# Patient Record
Sex: MALE | URBAN - METROPOLITAN AREA
[De-identification: names, ages, dates, MRNs, and addresses within clinical notes are randomized per-mention and may not be internally consistent; named-entity substitution may affect disease eponyms.]

---
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
## 2021-06-16 NOTE — PROGRESS NOTES
"Intake Note:   D) Jose Luis Santiago is a 17 y.o. single Other male who is referred to [facility] via Shana Valdovinos/Kaelyn Brady, Primary Therapist at [facility] to HE [facility] at [facility], with funding from [facility]. Patient orientated x 3. Patient meets criteria for Cannabis Use Disorder, Severe (F12.20).  Patient appears appropriate for [facility].   A) Met with patient for 35 minutes.  Completed intake assessment and preliminary paperwork. Patient was given and explained counselor & supervisor license number and contact info, Patient Bill of Rights, program rules/regulations, Program Abuse Prevention Plan, confidentiality & HIPPA policies, grievance procedure, presented ROIs, TB & HIV/AIDS policies & resources.    R)Patient signed and agreed to counselor & supervisor license number and contact info., Patient Bill of Rights, group rules/regulations, Program Abuse Prevention Plan, confidentiality & HIPPA policies, grievance procedure,  ROIs, TB & HIV/AIDS policies & resources. Patient scored Lower Risk on PANSI screen. Patient denied suicidal ideation/intent/plan/means at this time.     Opioid Use Disorder: No   Provided \"Options for Opioid Treatment in [place] and Overdose Prevention\" NA     Dimension #1 - Withdrawal Potential - Risk 0. No Concern.  Patient reports a history of daily marijuana use, with his last use being on 12/26/17. He also reports some use of xanax, with his last use being about 1 year ago. He reports some experimentation with alcohol and cocaine as well. He denies any current withdrawal symptoms, and does not appear to be at risk of withdrawal at this time.     Dimension #2 - Biomedical - Risk 0. No Concern.  Patient is currently under the care of [facility] medical unit for all medical issues. He denies any health concerns at this time. He appears to be in good physical health and functioning at this time. He denies any immediate medical concerns.     Dimension #3 - Emotional , Behavioral " and Cognitive - Risk 1. Mild Concern.  Patient reported no current mental health diagnosis. He does endorse some symptoms of depression and anxiety, and reports a great deal of anxiety currently related to his family and being at [facility]. He appears to struggle with impulse control as evidence by his placement at [facility], as well as his struggles to consistently meet program expectations at [facility]. He denies any suicidal or homicidal thoughts or plans.     Dimension #4 - Treatment Resistance - Risk 1. Mild Concern.  Patient reports that he does not believe that he has a substance use problem at this time, however reports that he is willing to stop his use. He was calm and cooperative throughout this intake, and shared that he is still not happy about being in group but accepts that he needs to do it and get it done. His motivation appears to be largely due to family pressure at this time as well as his legal involvement.     Dimension #5 - Relapse Potential - Risk 3. Serious Concern.  Patient reports no history of treatment and so appears to lack knowledge and skills about substance use and recovery. He reports a history of attempting to stop, but ultimately returned to use. This may indicate that he may lack some coping skills to arrest his use and prevent relapse outside of a controlled environment. He may be at an increased risk of relapse at this time if he returns to his old school and peer group. His lack of support may also indicate an increased risk of relapse at this time. He appears to lack some insight into his use and the issues it has caused him up until this time in his life.     Dimension #6 - Recovery Environment - Risk 3. Serious Concern.  Patient is currently in a court ordered out of home placement at [facility]. Patient reports that he was not attending school regularly in the community, and so appeared to lack structured activity in his daily life. He did report a job, but it is unclear how that was going.  Patient reports some support from his family, however per his  he does not have any support in the community outside of her. He does not appear to have meaningful activity in his life as he reports that when not at school he was using. He is currently on probation and will remain on following his release from [facility].     T) Explained counselor & supervisor license number and contact info, Patient Bill of Rights, program rules/regulations, Program Abuse Prevention Plan, confidentiality & HIPPA policies, grievance procedure, presented ROIs, TB & HIV/AIDS policies & resources, and Vulnerable Adult policy. Patient expected to start group on 3/1/18.      JOSE MARIA Robert  3/1/2018, 11:05 AM
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
## 2021-06-16 NOTE — PROGRESS NOTES
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 responsibility, both legal and care giving; and what arrangements are made for supervision for the children when the person is not available.    6B. Do you have children who do not live with you?  no  If yes, ask follow up questions to learn where the children are, who has custody and what the person's relationship and responsibility is with these children and what hopes the person has for his or her future with these children.    7A. Who supports you in making changes in your alcohol or drug use? What are they willing to do to support you? Who is upset or angry about you making changes in your alcohol or drug use? How big a problem is this for you?       Mother, aunties, mother's significant other, brothers, grandmother, and .     7B. This table is provided to record information about the person s relationships and available support It is not necessary to ask each item; only to get a comprehensive picture of their support system.  How often can you count on the following people when you need someone?   Partner / Spouse Always supportive   Parent(s)/Aunt(s)/Uncle(s)/Grandparents Always supportive   Sibling(s)/Cousin(s) Brothers- Always  Sisters- Rarely; does not believe they would be willing to stop their use.    Child(matt)    Other relative(s)  Always supportive   Friend(s)/neighbor(s) Rarely supportive- not willing to stop use   Child(matt) s father(s)/mother(s)    Support group member(s)    Community of [employer] members Always supportive   /counselor/therapist/healer Always supportive   Other (specify)  Always supportive     8A. What is your current living situation?       Patient is currently placed at the [facility] in the [facility] program.      8B. What is your long term plan for where you will be living?      Patient reports that he is thinking about going to a group home following [facility], otherwise he will live with his mother.     8C. Tell me about your living environment/neighborhood? Ask enough follow up questions to determine safety, criminal activity, availability of alcohol and drugs, supportive or antagonistic to the person making changes.       Reports that where his mother lives is safe, and he shared no concerns about crime or use.     9. Criminal justice history: Gather current/recent history and any significant history related to substance use--Arrests? Convictions? Circumstances? Alcohol or drug involvement? Sentences? Still on probation or parole? Expectations of the court? Current court order? Any sex offenses - lifetime? What level? (DSM)       Patient reported charges of assault (3-4), stolen vehicles, aggravated robbery, and possession of marijuana.   His record indicates the following charges:  Theft of a motor vehicle (3), receiving stolen property, tampering with a motor vehicle (2), assault; 2nd degree, 3rd degree (2), 5th degree, aggravated robbery, 5th degree possession, liquor consumption by a minor, sale of poisons.     10. What obstacles exist to participating in treatment? (Time off work, childcare, funding, transportation, pending shelter time, living situation)       NA    Dimension VI Ratings   Recovery environment - The placing authority must use the criteria in Dimension VI to determine a client s  recovery environment.   RISK DESCRIPTIONS - Severity rating: 3-Patient is not engaged in structured, meaningful activity and the patient's peers, family , significant other, and living environment are unsupportive, or there is significant criminal justice system involvement.    REASONS SEVERITY WAS ASSIGNED - Patient is currently in a court ordered out of home placement at [facility]. Patient reports that he was not attending school regularly in the community, and so appeared to lack structured activity in his daily life. He did report a job, but it is unclear how that was going. Patient reports some support from his family, however per his  he does not have any support in the community outside of her. He does not appear to have meaningful activity in his life as he reports that when not at school he was using. He is currently on probation and will remain on following his release from [facility].          Client Choice/Exceptions     Would you like services specific to language, age, gender, culture, Restorationist preference, race, ethnicity, sexual orientation or disability?  yes    If yes, specify:  Client is open to diverse groups.      What particular treatment choices and options would you like to have?  Male, Adolescent     Do you have a preference for a particular treatment program?  [facility]    DSM-V Criteria for Substance Abuse  Instructions  Determine whether the client currently meets the criteria for a Substance Use Disorder using the diagnostic criteria in the DSM-V, pp. 481-589. Current means during the most recent 12 months outside a facility that controls access to substances.    Category of substance Severity ICD-10 Code/DSM V Code   []  Alcohol Use Disorder [] Mild  [] Moderate  [] Severe [] (F10.10) (305.00)  [] (F10.20) (303.90)  [] (F10.20) (303.90)   [x]  Cannabis Use Disorder [] Mild  [] Moderate  [x] Severe [] (F12.10) (305.20)  [] (F12.20) (304.30)  [x] (F12.20) (304.30)   []  Hallucinogen Use Disorder [] Mild  [] Moderate  [] Severe [] (F16.10) (305.30)  [] (F16.20) (304.50)  [] (F16.20) (304.50)   []  Inhalant Use Disorder [] Mild  [] Moderate  [] Severe [] (F18.10) (305.90)  [] (F18.20) (304.60)  [] (F18.20) (304.60)   []  Opioid Use Disorder [] Mild  [] Moderate  [] Severe [] (F11.10) (305.50)  [] (F11.20) (304.00)  [] (F11.20) (304.00)   []  Sedative, Hypnotic, or Anxiolytic Use Disorder [] Mild  [] Moderate  [] Severe [] (F13.10) (305.40)   [] (F13.20) (304.10)  [] (F13.20) (304.10)   []  Stimulant Related Disorders [] Mild  [] Moderate  [] Severe [] (F15.10) (305.70) Amphetamine type substance  [] (F14.10) (305.60) Cocaine  [] (F15.10) (305.70) Other or unspecified stimulant  [] (F15.20) (304.40) Amphetamine type substance  [] (F14.20) (304.20) Cocaine  [] (F15.20) (304.40) Other or unspecified stimulant  [] (F15.20) (304.40) Amphetamine type substance  [] (F14.20) (304.20) Cocaine  [] (F15.20) (304.40) Other or unspecified stimulant   []  Tobacco use Disorder [] Mild  [] Moderate  [] Severe [] (Z72.0) (305.1)  [] (F17.200) (305.1)  [] (F17.200) (305.1)   []  Other (or unknown) Substance Use Disorder [] Mild  [] Moderate  [] Severe [] (F19.10) (305.90)  [] (F19.20) (304.90)  [] (F19.20) (304.90)       Suggested Level of Care Necessary for Recovery  []  Inpatient  []  Extended Care []  Residential [x]  Outpatient  []  None      Collateral Contact Summary   Number of contacts made:  2  Contact with referring person:  yes     If court related records were reviewed, summarize here:  Records indicate positive UAs for THC on 12/26/17, 11/30/17, 11/21/17, 11/14/17, 11/3/14 (positive for THC and Cocaine), and 9/22/17. YLS indicates high risk in substance use category.     [x] Information from collateral contacts supported/largely agreed with information from the client and associated risk ratings./  [] Information from collateral contacts was significantly different from information from  the client and lead to different risk ratings.      Summarize new information here:      Rule 25 Assessment Summary and Plan   's Recommendation    Patient meets DSM IV criteria for Cannabis Use Disorder, Severe (F12.20).  Recommendation is for this patient to attend the [facility] AIOP program while at the [facility] and follow aftercare recommendations once programming is complete.      Collateral Contacts     Please duplicate this page for each contact.  If this includes information which is sensitive and not public, separate this page from the rest of the assessment before sharing.  Retain the page in the assessment file.   Name    Shana Bonifacio/Kaelyn Brady/[facility] Staff Relationship    SW/Primary Therapist Phone Number    [phone] Releases    yes       Information Provided:      Shana provided the following information:  Patient has a history of marijuana use, and has tested positive for cocaine once.   He reports that he has only used cocaine the one time he tested positive for it.   His mother reports concern regarding substance use.    shares concerns as well.     Kaelyn expressed concern about the patient's use as he has has a number of positive UAs.     Collateral Contacts     Name    Yumiko Dangelo/[facility]/   Relationship     Phone Number    [phone] Releases    yes       Information Provided:      Attempted to contact Yumiko 2/12/18 @ 8574. No answer, left message to return call with any information.       Yumiko reported the following:    She is concerned about the patient's substance use.   She has collected UAs from the patient that have typically been positive for THC, but believes that he has tested positive for opiates and cocaine at least 1 time.   His marijuana use was much more prevalent than the other use.   She has gotten a high number of reports from different community programs that the patient was involved in that he  would either seem high while there, or would return smelling strongly of marijuana.   She never saw or experienced the patient under the influence, but it has become very obvious to her that he is now sober as his physical appears have changed (complexion and such).   His mother has expressed concern to her as well, and sent her a snapchat video where the patient was rolling a blunt.   His substance use has impacted everything in his life, and he seemed very preoccupied by it.   Patient was trying to clean out his system while in the community, by taking detox pills. This was reported to her by the patient's sister as well as pills that he had in his backpack upon arriving to [facility]. She believes that the patient was attempting to get around the system, but it was not working as he was consistently coming back positive for marijuana.   Patient did complete an assessment before he was on probation that recommended individual therapy. He began doing this and then stopped when his legal proceeding began. She struggled to get the patient to even go to school let alone therapy.   She believes that services here would be good, as long as there is continuing care in the community as she feels that he is going to need more support than just support groups.   Patient does not have any support in the community other than her, as his mother tries, but has a lot of history and so she does not feel that the patient always listens and takes what his mother says to heart.         Staff Name and Title:  JOSE MARIA Robert  Date: 2/12/2018  Time:  12:25 PM

## 2021-06-16 NOTE — PROGRESS NOTES
Addiction Services - Initial Services Plan/Treatment Plan     Patient  Name: Jose Luis Santiago  MRN: [id number]   : 2000  Admit Date: 3/1/18       Patient describes their immediate need: To learn recovery skills to prevent relapse. Patient denies any other immediate needs.     Are there any immediate Safety Needs such as (physical, stability, mobility): Patient is under the care of [facility]. Pt is able to get medical care as needed. Pt denies immediate concerns.     Immediate Health Needs and Plan:   Remain abstinent from substance use and attend first group therapy session on 3/1/18    Vulnerable Adult: No     Issues to be addressed in the first sessions:   Treatment planning, orientation to group norms and rules, and welcomed by peers. Patient to complete substance use history packet and new group member questions.     Patient strengths and needs:   Strengths identified as good communication skills, hardworking, and coordinated.   Needs identified as relationship skills, anger, and time management.     Plan for patient for time between intake and completion of the treatment plan:   Attend all group therapy sessions as directed, complete all written and oral assignments as directed, and remain clean and sober. A relapse, if any, must be reported to staff immediately in order to ensure you are receiving the proper level of care.       Dimension 1: Acute Intoxication/Withdrawal Potential, Risk level: 0    Problem: Patient's last reported use was on 17.  Goal: Patient will remain abstinent throughout treatment, and his time at [facility].   Must be reached to complete treatment? Yes  Methods/Strategies (must include amount and frequency): Patient will report to his counselor immediately if any relapse/use happens.   Target Date: 18  Completion Date:     Dimension 2: Biomedical Conditions/Complications, Risk level: 0    Problem: Patient denies any medical concerns at this time, and appears to be  in good physical health.   Goal: Patient will maintain good health.   Must be reached to complete treatment? No  Methods/Strategies (must include amount and frequency): Patient will practice proper diet, sleep, and exercise habits daily in order to maintain his health.   Target Date: 5/29/18  Completion Date:   Must be reached to complete treatment? No  Methods/Strategies (must include amount and frequency): Patient will remain under the care of [facility] for all health needs and concerns.   Target Date: 5/29/18  Completion Date:  Must be reached to complete treatment? No  Methods/Strategies (must include amount and frequency): Patient will take medication, if any, as prescribed, if applicable.    Target Date: 5/29/18  Completion Date:         Dimension 3: Emotional/Behavioral/Cognitive, Risk level: 1    Problem: Patient struggles with consistently making positive decision.   Goal: Patient will gain insight into his behavior in order to work toward making better choices.   Must be reached to complete treatment? Yes  Methods/Strategies (must include amount and frequency): Patient will begin using coping skills (such as grounding, breathing, thought challenging, mindfulness, etc), and share any benefits or challenges that you experience using these skills.  Target Date: 5/29/18  Completion Date:       Dimension 4: Readiness to Change, Risk level 1    Problem: Patient reports minimal motivation for treatment at this time.   Goal: Patient to increase motivation towards recovery by participating in outpatient programming.   Must be reached to complete treatment? Yes  Methods/Strategies (must include amount and frequency):   Patient to attend 2, 1.5- 2hour groups per week and all scheduled 1:1s.  Target Date: 5/29/18  Completion Date:     Dimension 5: Relapse/Continued Use/Continued Problem Potential, Risk level: 3    Problem: Patient lacks knowledge about substance use and recovery.   Goal: Patient will gain  knowledge about substance use and recovery.   Must be reached to complete treatment? Yes  Methods/Strategies (must include amount and frequency): Patient will attend educational groups 2 times per week, and actively engage in order to gain knowledge.   Target Date: 5/29/18  Completion Date:     Problem: Patient lacks insight into his past use.   Goal: Patient will gain insight into his past use.   Must be reached to complete treatment? Yes  Methods/Strategies (must include amount and frequency):   Patient to share in daily check-in any urges and addictive thinking to better understand his pattern of use and to prevent relapse in the future.   Target Date: 5/29/18  Completion Date:     Dimension 6: Recovery Environment, Risk level: 3    Problem: Family involvement in treatment  Goal: Patient will involve family in his CD treatment.   Must be reached to complete treatment? Yes  Methods/Strategies (must include amount and frequency): Patient will attend and actively participate in all required staffings (mid-point, pre-release, etc) and update his family on his progress in CD.  Target Date: 5/29/18  Completion Date:     Problem: Coordination with the school system to address academic needs.  Goal: Patient will work to meet educational needs while at [facility], and develop a plan for his return to the community.   Must be reached to complete treatment? Yes  Methods/Strategies (must include amount and frequency): Patient will attend school daily (Monday-Friday) while at [facility] in order to meet educational needs, and gain school credits.   Target Date: 5/29/18  Completion Date:   Must be reached to complete treatment? No  Methods/Strategies (must include amount and frequency): Patient will work with his transition  to develop a plan for where he will attend school in the community, and register.   Target Date: 5/29/18  Completion Date:     Problem: Patient lacks a plan to address leisure activities without chemical  use.  Goal: Patient will develop sober hobbies.   Must be reached to complete treatment? Yes  Methods/Strategies (must include amount and frequency): Patient will engage in sober activity (rec, reading, large muscle, and other activities as allowed, etc) while at [facility] to develop things he enjoys doing without substance use.  Target Date: 5/29/18  Completion Date:     Resources  Resources to which the patient is being referred for problems when problems are to be addressed concurrently by another provider: AMADOU Varela/Primary Therapist, [facility] Staff, [facility], Probation, [employer].      Vulnerable Adult Review   [X] Review of the facility Abuse Prevention plan was reviewed with the patient   [X] No individual abuse plan is necessary   [ ] In addition to the facility Abuse Prevention plan, an Individual Abuse Plan will be put in place       Staff Name/Title: JOSE MARIA Robert Date: 3/1/2018  Time: 11:05 AM    By signing this document, I am acknowledging that I was actively and directly involved in the development of my treatment plan.       Patient  Signature_________________________________________             Date__________________
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
## 2021-06-16 NOTE — PROGRESS NOTES
Individual Treatment Plan    Patient  Name: Jose Luis Santiago  MRN: [id number]   : 2000  Admit Date: 3/1/8  Date of Initial Service Plan: 3/1/18  Tentative Discharge Date: 18    Counselor: JOSE MARIA Robert      Dimension 1: Acute Intoxication/Withdrawal Potential, Risk level: 0    Problem: Patient's last reported use was on 17.  Goal: Patient will remain abstinent throughout treatment, and his time at [facility].   Must be reached to complete treatment? Yes  Methods/Strategies (must include amount and frequency): Patient will report to his counselor immediately if any relapse/use happens.   Target Date: 18  Completion Date:       Dimension 2: Biomedical Conditions/Complications, Risk level: 0    Problem: Patient denies any medical concerns at this time, and appears to be in good physical health.   Goal: Patient will maintain good health.   Must be reached to complete treatment? No  Methods/Strategies (must include amount and frequency): Patient will practice proper diet, sleep, and exercise habits daily in order to maintain his health.   Target Date: 18  Completion Date:   Must be reached to complete treatment? No  Methods/Strategies (must include amount and frequency): Patient will remain under the care of [facility] for all health needs and concerns.   Target Date: 18  Completion Date:  Must be reached to complete treatment? No  Methods/Strategies (must include amount and frequency): Patient will take medication, if any, as prescribed.   Target Date: 18  Completion Date:         Dimension 3: Emotional/Behavioral/Cognitive, Risk level: 1    Problem: Patient struggles with consistently making positive decision.   Goal: Patient will gain insight into his behavior in order to work toward making better choices.   Must be reached to complete treatment? Yes  Methods/Strategies (must include amount and frequency): Patient will begin using coping skills (such as grounding,  breathing, thought challenging, mindfulness, etc), and share any benefits or challenges that you experience using these skills.  Target Date: 5/29/18  Completion Date:     Problem: Patient reports that he struggles to effectively manage his anger.   Goal: Patient will gain skills to help him effectively manage his anger.   Must be reached to complete treatment? Yes  Methods/Strategies (must include amount and frequency): Patient will complete ANGER INVENTORY worksheet, and share with his counselor.   Target Date: 4/5/18  Completion Date:     Problem: Patient reports that he would like to work to build healthier relationships.   Goal: Patient will gain insight and skills in order to help him develop healthy relationships.   Must be reached to complete treatment? Yes  Methods/Strategies (must include amount and frequency): Patient will complete PERSONAL RELATIONSHIPS workbook, and share with his counselor.   Target Date: 4/12/18  Completion Date:       Dimension 4: Readiness to Change, Risk level 1    Problem: Patient repots minimal motivation for treatment at this time.   Goal: Patient will increase motivation for treatment and change.   Must be reached to complete treatment? Yes  Methods/Strategies (must include amount and frequency): Patient will attend 2 groups per week, for 1.5-2 hours per time, and actively participate appropriately.   Target Date: 5/29/18  Completion Date:   Must be reached to complete treatment? Yes  Methods/Strategies (must include amount and frequency): Patient will complete PROS &CONS worksheet, and share with his counselor.   Target Date: 4/19/18  Completion Date:       Dimension 5: Relapse/Continued Use/Continued Problem Potential, Risk level: 3    Problem: Patient lacks knowledge about substance use and recovery.   Goal: Patient will gain knowledge about substance use and recovery.   Must be reached to complete treatment? Yes  Methods/Strategies (must include amount and frequency):  Patient will attend 1.5-2 hour educational groups 2 times per week, and actively engage in order to gain knowledge.   Target Date: 5/29/18  Completion Date:     Problem: Patient lacks skills to prevent relapse.   Goal: Patient will gain skills to help him prevent relapse.   Must be reached to complete treatment? Yes  Methods/Strategies (must include amount and frequency): Patient will complete HEALTHY COPING SKILLS worksheet, and share with his counselor.   Target Date: 5/3/18  Completion Date:     Problem: Patient lacks a plan to maintain his sobriety.   Goal: Patient will develop a plan to help maintain his sobriety.   Must be reached to complete treatment? Yes  Methods/Strategies (must include amount and frequency): Patient will complete MY ACTION PLAN packet, and share with his counselor.   Target Date: 5/17/18  Completion Date:     Dimension 6: Recovery Environment, Risk level: 3    Problem: Family involvement in treatment  Goal: Patient will involve his family in his CD treatment.   Must be reached to complete treatment? Yes  Methods/Strategies (must include amount and frequency): Patient will attend and actively participate in all required staffings (mid-point, pre-release, etc) and update his family on his progress in CD.  Target Date: 5/29/18  Completion Date:     Problem: Coordination with the school system to address academic needs.  Goal: Patient will work to meet educational needs while at [facility], and develop a plan for his return to the community.   Must be reached to complete treatment? Yes  Methods/Strategies (must include amount and frequency): Patient will attend school daily (Monday-Friday) while at [facility] in order to meet educational needs, and gain school credits.   Target Date: 5/29/18  Completion Date:   Must be reached to complete treatment? No  Methods/Strategies (must include amount and frequency): Patient will work with his transition  to develop a plan for where he will attend  school in the community, and register.   Target Date: 5/29/18  Completion Date:     Problem: Patient lacks a plan to address leisure activities without chemical use.  Goal: Patient will develop sober hobbies.   Must be reached to complete treatment? Yes  Methods/Strategies (must include amount and frequency): Patient will engage in sober activity (rec, reading, large muscle, and other activities as allowed, etc) while at [facility] to develop things he enjoys doing without substance use.  Target Date: 5/29/18  Completion Date:   Must be reached to complete treatment? Yes  Methods/Strategies (must include amount and frequency): Patient will complete BENEFITS OF HOBBIES &ACTIVITIES IN RECOVERY worksheet, and share with his counselor.   Target Date: 5/10/18  Completion Date:         Resources  Resources to which the patient is being referred for problems when problems are to be addressed concurrently by another provider: AMADOU Matt/Primary Therapist, [facility] Staff, [facility], Probation, [employer].      By signing this document, I am acknowledging that I was actively and directly involved in the development of my treatment plan.           Patient  Signature_________________________________________         Date__________________        Staff Signature  JOSE MARIA Robert    Date: 3/20/2018, 5:18 PM
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
## 2021-06-17 NOTE — PROGRESS NOTES
Patient was released from [facility] today (5/3/18). The decision has therefore been made to discharge him from treatment programming.     JOSE MARIA Robert   5/3/2018  2:01 PM

## 2021-06-17 NOTE — PROGRESS NOTES
Jose Luis Santiago attended 1 hour of group therapy today. He was asked to leave partway into group due to unwillingness to follow directions and attempting to fall asleep during the group video.     4/24/2018 5:37 PM Kassie Massey

## 2021-06-17 NOTE — PROGRESS NOTES
[facility] SUBSTANCE USE DISORDER  DISCHARGE SUMMARY          NAME:  Jose Luis Santiago   Physician:  Dr. Lyn   MRN:  [id number]   :  JOSE MARIA Robert   Admit Date: 3/1/18   Discharge Date: 5/3/18   :  2000   Hours Completed: 27   Initial Diagnosis:  Cannabis Use Disorder, Severe (F12.20)   Final Diagnosis:  Cannabis Use Disorder, Severe (F12.20)- In Early Remission, In a Controlled Environment.    Discharge Address:    [facility] [address]  [facility] [address]   Funding Source:    [facility].        Discharge Type:  Premature with Staff Approval (PWSA)    Client was receiving residential services at the time of discharge:   No      Reasons for and circumstances of service termination:  Patient was released from [facility]. He is therefore being discharged from treatment services prematurely with staff approval at this time. Patient completed his written treatment work, and was generally engaged in treatment groups. He did struggle at times when he had a negative attitude, and was resistant to engage, but overall did okay.        If program discharge status was At Staff Request, the license moore must identify the following:    Other interested parties conferred with: NA    Referrals provided: NA    Alternatives considered and attempted before deciding to discharge:  NA      Dimension/Course of Treatment/Individualized Care:   1.  Withdrawal Potential - Intake Risk level -  0 Discharge Risk level - 0  Narrative supporting risk description:  Patient reports a history of daily marijuana use, with his last reported use being on 17. He reported some history of Xanax use as well. He has denied any withdrawal symptoms throughout his time in treatment. He has no reported any use or relapses. He has shown no signs or symptoms of intoxication or withdrawal while in treatment.   Treatment plan goals and progress towards those goals:  Patient has remained sober  while in treatment. He has reported no use or relapses while at [facility]. He has returned from home visits and has been able to produce clean UAs. He has shown no physical signs of intoxication or withdrawal.      2.  Biomedical Conditions and Complications - Intake Risk level -  0 Discharge Risk level - 0  Narrative supporting risk description:  Patient has been under the care of [facility] while he was at [facility]. He has denied any medical concerns throughout treatment. He appears to be in good physical health and functioning. He appears able to get his medical needs met as necessary.   Treatment plan goals and progress towards those goals:  Patient appears to have maintained adequate health during his time in treatment. He appears to have been practicing proper diet, sleep, and exercise habits generally.   Patient has remained under the care of [facility] for his health needs. He has been able to get his needs met as necessary. He has not mentioned any new or worsening concerns to this writer.   Patient has not reported starting any new medications while in treatment, and there have been no reports of noncompliance in regards to medications.      3.  Emotional/Behavioral/Cognitive Conditions and Complications - Intake Risk level -  1 Discharge Risk level - 1  Narrative supporting risk description:  Patient has reported no mental health diagnoses while in treatment. He has endorsed some symptoms of anxiety and depression, but does not have a formal diagnosis. He continues to struggle with impulse control as evidence by some of his behaviors while at [facility]. However, these instances do appear to have become less frequent. He has denied any suicidal or homicidal thoughts or plans while in treatment.   Treatment plan goals and progress towards those goals:  Patient appears to have gained some insight into his behavior, however did often struggle to take accountability for himself. When redirected he often  attempted to shift blame out get out of a consequence.   Patient completed his anger inventory worksheet, and appears to have gained some skills to manage his anger. This was evident by his general ability to appropriately manage himself in group as well as his limited trips to the secure unit.   Patient completed his personal relationships workbook as a way to gain some insight and skills to better develop healthy relationships. 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      4.  Readiness for Change - Intake Risk level -  1 Discharge Risk level - 1  Narrative supporting risk description:  Patient has been engaged while in treatment. He did struggle at times with participating appropriately, but overall was able to do well. He appeared motivated to successfully complete treatment, but this may have been largely due to his placement and legal involvement. He did verbalize some possible internal motivation to stay sober, however it appears that most of his motivation comes from being on EJJ and not wanting to be violated and having to go back to a placement or treatment. He appears somewhat contemplative about a real desire to stay sober in the future.   Treatment plan goals and progress towards those goals:  Patient completed his pros & cons worksheet. He was able to identify benefits of stopping his use as well as the downsides. He did verbalize throughout treatment that he has a desire to stay sober in the future as he wants to be successful on probation, but he did have a consistent doubt of his ability to actually stay sober, largely due to the easy access to marijuana in the community.      5.  Relapse/Continued Use/Continued Problem Potential - Intake Risk level -  3 Discharge Risk level - 3  Narrative supporting risk  description:  Patient completed his first treatment experience. He therefore appears to have gained some knowledge of addiction and recovery. He was generally engaged while in treatment in order to gain the knowledge presented to him. He has verbalized some desire to stay sober, but some uncertainty of his ability to do so. He may continue to be at an increased risk of relapse due to his reports that marijuana is present in his home, as well as his minimal practice utilizing the coping skills that he has learned while in treatment. He may also be at an increased risk if he chooses to return to his old peer group in the community where use is prevalent.   Treatment plan goals and progress towards those goals:  Patient attended groups as provided in order to gain knowledge about addiction and recovery. He was generally a responsible participant, but did struggle from time to time.   He completed his healthy coping skills worksheet. He was able to identify the skills that he has used or feels that he could use in the future that will be beneficial and effective in helping him stay sober.   Patient completed his Action Plan packet. He worked hard to complete it and was able to share it with all of the necessary people to create some accountability and support for himself in the future.      6.  Recovery Environment - Intake Risk level -  3 Discharge Risk level - 2  Narrative supporting risk description:  Patient was released from [facility], a court ordered out of home placement. He has been enrolled in school in the community as he has been working with his transition  to develop a plan for his education in the community. It is unclear if the patient has secured a job in the community, but he does appear to have some structure that will be provided by school. Patient is currently on J probation and so will remain on until he is 22 years old. As stated above, it is unclear if the patient has support in the  community for his sobriety at this time, and so additional services are recommended if funding will allow.   Treatment plan goals and progress towards those goals:  Patient was able to involve his family in his treatment. He participated in staffings as required. He was able to share his action plan with a family member in order to create more support and accountability for himself in the community.   Patient worked to meet his educational needs. He attended school daily while at [employer] [facility]. He has worked with his transition  to develop a plan for his education in the community upon his release.   Patient appears to have engaged in activities, as provided, by [facility] in order to gain some activities that he enjoys doing while sober. He was able to share that he was working to become better at basketball.   Patient completed his benefits of hobbies & activities in recovery worksheet, and was able to identify what he would like his life to look like in the future, without substance use. He did well to identify hopes and goals that he has that don't involve substance use.      Strengths: hard working, can be a positive leader, insightful, some desire to succeed and do well, helpful, smart.   Needs: consistency, positive peer support, supportive home environment, structured activity, personal accountability, healthy boundaries, healthy relationships and communication skills.     Services Provided: Intake, assessment, treatment planning, education, group discussion, film, lectures, 1x1 therapy, and recommendations at discharge.        Program Involvement: Fair  Attendance: Good  Ability to relate in group/   Other program activities: Good  Assignment Completion: Good  Overall Behavior: Fair  Reported Family/Significant   Other Involvement: Unknown    Prognosis: Guarded- due to patient's verbalization of easily access to marijuana as well as it being present in his home.     Recommendations       Abstain from all mood  altering chemicals, identify and maintain a sober network of friends and follow probation expectations including random UAs.  Engage in structured activities such as school, employment, sports, library, [employer] membership and daily exercise.    Patient is recommended to attend and participate in structured support following his release, such as additional treatment or individual therapy, if funding allows. It appears that he may benefit from ongoing professional support in the community for his substance use.      Attend 2-community sobriety meetings, in order to know where they are in case he feels he needs more support in the community.    Here are the numbers to find 12 step meetings:  AA Intergroup Office: [phone]  NA's Help Line: [phone].    If relapse should occur, call [facility] at [phone] to be reassessed.      Additional services if relapse should occur:  -[facility]-[phone]  -[facility]- [phone]  -[facility] [facility]-[phone]    Mental Health Referral  Follow recommendations from [facility].    Physician Health Referral  Follow recommendations from [facility].      Counselor Name and Title:  JOSE MARIA Robert        Date:  5/3/2018  Time:  3:19 PM
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
## 2021-06-17 NOTE — PROGRESS NOTES
Jose Luis Santiago attended 2 hours of group therapy today.    4/12/2018 5:37 PM Kassie Massey   Alert and oriented to person, place and time, memory intact, behavior appropriate to situation, PERRL.
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
